# Patient Record
Sex: FEMALE | Race: WHITE | Employment: OTHER | URBAN - METROPOLITAN AREA
[De-identification: names, ages, dates, MRNs, and addresses within clinical notes are randomized per-mention and may not be internally consistent; named-entity substitution may affect disease eponyms.]

---

## 2022-12-20 ENCOUNTER — HOSPITAL ENCOUNTER (OUTPATIENT)
Dept: AUDIOLOGY | Age: 57
Discharge: HOME | End: 2022-12-20

## 2022-12-20 PROCEDURE — 9990000010 HC NO CHARGE VISIT: Performed by: AUDIOLOGIST

## 2022-12-20 NOTE — PROGRESS NOTES
HEARING AID EVALUATION    Patient presents with audiometric testing for hearing aid evaluation/selection. After consultation, the patient would like to try left Phonak HO, rechargeable. The patient has [employer]. Medical necessity was obtained from Dr. Marisabel Feldman and information will be submitted to insurance. Pending approval, hearing aids will be ordered. The patient was not scheduled for a hearing aid fitting at this time. LESLY Cristina.   Audiology Intern (4th Year)     Kaleb Grajeda M.A., [address] [facility] [id number]  Electronically signed by Franca Casanova on 12/20/2022 at 2:28 PM

## 2023-01-24 ENCOUNTER — TELEPHONE (OUTPATIENT)
Dept: AUDIOLOGY | Age: 58
End: 2023-01-24

## 2023-01-24 NOTE — TELEPHONE ENCOUNTER
Called patient to schedule HAF. Left message.      Electronically signed by Anton Beyer on 1/24/2023 at 4:12 PM

## 2023-02-03 ENCOUNTER — HOSPITAL ENCOUNTER (OUTPATIENT)
Dept: AUDIOLOGY | Age: 58
Discharge: HOME | End: 2023-02-03
Payer: COMMERCIAL

## 2023-02-03 PROCEDURE — 9990000010 HC NO CHARGE VISIT: Performed by: AUDIOLOGIST

## 2023-02-03 NOTE — PROGRESS NOTES
Fit with monaural  left Oticon Lily Snooks /BTE hearing aid. Instructed in use and care. Gave  battery charger, warranty information and scheduled  30 day check for 3/3/23. Hearing aid contract/battery warning form reviewed and signed. Hearing aids paired to phone janeen. Patient was satisfied and will follow up on above date, unless problems arise. No charge visit today. Will bill at 30 day follow up per [facility] guidelines.      Electronically signed by Sarah Ruvalcaba on 2/3/2023 at 3:07 PM

## 2023-03-27 ENCOUNTER — HOSPITAL ENCOUNTER (OUTPATIENT)
Dept: AUDIOLOGY | Age: 58
Discharge: HOME | End: 2023-03-27
Payer: COMMERCIAL

## 2023-03-27 PROCEDURE — V5257 HEARING AID, DIGIT, MON, BTE: HCPCS | Performed by: AUDIOLOGIST

## 2023-03-27 PROCEDURE — V5241 DISPENSING FEE, MONAURAL: HCPCS | Performed by: AUDIOLOGIST

## 2023-03-27 NOTE — PROGRESS NOTES
Patient here for hearing aid follow up visit. She is doing well, except at restaurants, etc. She can hear conversations farther away better than close. The CR was changed and second program added for background noise. She will try and call if needs more changes. Also complained of pain behind ear; hearing aid causing \"bumps\". Changed speaker length from 2 to 3 and states it feels better. She will let this office know if continues to have discomfort. Ordered replacement online to be returned to stock. BILLED MON DIG BTE AND DISP FEE. Return as needed.      Electronically signed by Mariia Avendaño on 3/27/2023 at 3:27 PM  Iliana Thapa M.A., [address] [facility] [id number]

## 2024-05-28 ENCOUNTER — HOSPITAL ENCOUNTER (OUTPATIENT)
Dept: AUDIOLOGY | Age: 59
Discharge: HOME | End: 2024-05-28

## 2024-05-28 PROCEDURE — 9990000010 HC NO CHARGE VISIT: Performed by: AUDIOLOGIST

## 2024-05-28 NOTE — PROGRESS NOTES
Patient settings have been working for her.     Today she states she is  having problems with janeen. She has old ON janeen and the  janeen both on her phone. She also states that the aid will randomly turn off.     The firmware was updated, the old janeen deleted and paired to new janeen.     Asked her to try and see if this fixes the problem of shutting down.   If problem persists, gave her drop off form and explained how to drop off aid to me at the [facility] [place] .     Fiorella Garza M.A., CCC/A  [place] [employer] [id number]  Electronically signed by Fiorella Mckinney on 5/28/2024 at 3:25 PM